# Patient Record
Sex: FEMALE | Race: WHITE
[De-identification: names, ages, dates, MRNs, and addresses within clinical notes are randomized per-mention and may not be internally consistent; named-entity substitution may affect disease eponyms.]

---

## 2019-06-17 ENCOUNTER — HOSPITAL ENCOUNTER (EMERGENCY)
Dept: HOSPITAL 8 - ED | Age: 44
Discharge: HOME | End: 2019-06-17
Payer: COMMERCIAL

## 2019-06-17 VITALS — WEIGHT: 163.14 LBS | BODY MASS INDEX: 24.73 KG/M2 | HEIGHT: 68 IN

## 2019-06-17 VITALS — SYSTOLIC BLOOD PRESSURE: 112 MMHG | DIASTOLIC BLOOD PRESSURE: 68 MMHG

## 2019-06-17 DIAGNOSIS — G43.909: Primary | ICD-10-CM

## 2019-06-17 DIAGNOSIS — R11.2: ICD-10-CM

## 2019-06-17 LAB
ALBUMIN SERPL-MCNC: 4 G/DL (ref 3.4–5)
ALP SERPL-CCNC: 59 U/L (ref 45–117)
ALT SERPL-CCNC: 26 U/L (ref 12–78)
ANION GAP SERPL CALC-SCNC: 8 MMOL/L (ref 5–15)
BASOPHILS # BLD AUTO: 0.03 X10^3/UL (ref 0–0.1)
BASOPHILS NFR BLD AUTO: 0 % (ref 0–1)
BILIRUB SERPL-MCNC: 1 MG/DL (ref 0.2–1)
CALCIUM SERPL-MCNC: 8.6 MG/DL (ref 8.5–10.1)
CHLORIDE SERPL-SCNC: 110 MMOL/L (ref 98–107)
CREAT SERPL-MCNC: 0.77 MG/DL (ref 0.55–1.02)
EOSINOPHIL # BLD AUTO: 0.01 X10^3/UL (ref 0–0.4)
EOSINOPHIL NFR BLD AUTO: 0 % (ref 1–7)
ERYTHROCYTE [DISTWIDTH] IN BLOOD BY AUTOMATED COUNT: 13.6 % (ref 9.6–15.2)
LYMPHOCYTES # BLD AUTO: 0.44 X10^3/UL (ref 1–3.4)
LYMPHOCYTES NFR BLD AUTO: 5 % (ref 22–44)
MCH RBC QN AUTO: 30.4 PG (ref 27–34.8)
MCHC RBC AUTO-ENTMCNC: 33 G/DL (ref 32.4–35.8)
MCV RBC AUTO: 92.1 FL (ref 80–100)
MD: NO
MONOCYTES # BLD AUTO: 0.13 X10^3/UL (ref 0.2–0.8)
MONOCYTES NFR BLD AUTO: 2 % (ref 2–9)
NEUTROPHILS # BLD AUTO: 8.35 X10^3/UL (ref 1.8–6.8)
NEUTROPHILS NFR BLD AUTO: 93 % (ref 42–75)
PLATELET # BLD AUTO: 278 X10^3/UL (ref 130–400)
PMV BLD AUTO: 8.2 FL (ref 7.4–10.4)
PROT SERPL-MCNC: 7.2 G/DL (ref 6.4–8.2)
RBC # BLD AUTO: 4.13 X10^6/UL (ref 3.82–5.3)

## 2019-06-17 PROCEDURE — 36415 COLL VENOUS BLD VENIPUNCTURE: CPT

## 2019-06-17 PROCEDURE — 96374 THER/PROPH/DIAG INJ IV PUSH: CPT

## 2019-06-17 PROCEDURE — 84703 CHORIONIC GONADOTROPIN ASSAY: CPT

## 2019-06-17 PROCEDURE — 70450 CT HEAD/BRAIN W/O DYE: CPT

## 2019-06-17 PROCEDURE — 96361 HYDRATE IV INFUSION ADD-ON: CPT

## 2019-06-17 PROCEDURE — 80053 COMPREHEN METABOLIC PANEL: CPT

## 2019-06-17 PROCEDURE — 85025 COMPLETE CBC W/AUTO DIFF WBC: CPT

## 2019-06-17 PROCEDURE — 96375 TX/PRO/DX INJ NEW DRUG ADDON: CPT

## 2019-06-17 PROCEDURE — 99284 EMERGENCY DEPT VISIT MOD MDM: CPT

## 2019-06-17 NOTE — NUR
BACK FROM CT. PT RESTING COMFORTABLY ON GURNEY, VSS, AAO X4, ALL NEEDS MET AND 
FALL PRECAUTIONS IN PLACE.

## 2019-06-17 NOTE — NUR
BIB [facility] TO ROOM 15 FOR H/A AT 0530. PT TOOK MEDICATION AT HOME PTA. PT ALSO 
REPORTS N/V X 5 TIMES AND DIARRHEA X1. HX BRAIN CANCER WITH 
SURGERY/CHEMO/RADIATION. VSS, PT AAO X 4, PIV ESTABLISHED IN FIELD. PT IN GOWN 
AND RESTING IN [facility].

## 2021-10-21 VITALS
SYSTOLIC BLOOD PRESSURE: 130 MMHG | WEIGHT: 173.94 LBS | HEIGHT: 68 IN | DIASTOLIC BLOOD PRESSURE: 84 MMHG | RESPIRATION RATE: 16 BRPM | TEMPERATURE: 97 F | HEART RATE: 64 BPM | OXYGEN SATURATION: 100 %

## 2021-10-21 NOTE — ASU PATIENT PROFILE, ADULT - NSICDXPASTMEDICALHX_GEN_ALL_CORE_FT
PAST MEDICAL HISTORY:  Anxiety     Central pain syndrome     Migraine      PAST MEDICAL HISTORY:  Anxiety     Brain cancer s/p radiation      Central pain syndrome     Endometriosis     H/O weakness RLE    Migraine     JAYNA treated with BiPAP      PAST MEDICAL HISTORY:  Adenomyosis     Anxiety     Anxiety and depression     Brain cancer s/p radiation      Central pain syndrome     Endometriosis     H/O weakness RLE    Migraine     JAYNA treated with BiPAP     Simple partial seizures

## 2021-10-22 ENCOUNTER — INPATIENT (INPATIENT)
Facility: HOSPITAL | Age: 46
LOS: 0 days | Discharge: HOME | DRG: 743 | End: 2021-10-23
Attending: OBSTETRICS & GYNECOLOGY | Admitting: OBSTETRICS & GYNECOLOGY
Payer: MEDICARE

## 2021-10-22 DIAGNOSIS — R32 UNSPECIFIED URINARY INCONTINENCE: ICD-10-CM

## 2021-10-22 DIAGNOSIS — Z98.890 OTHER SPECIFIED POSTPROCEDURAL STATES: Chronic | ICD-10-CM

## 2021-10-22 DIAGNOSIS — F41.9 ANXIETY DISORDER, UNSPECIFIED: ICD-10-CM

## 2021-10-22 DIAGNOSIS — K37 UNSPECIFIED APPENDICITIS: ICD-10-CM

## 2021-10-22 DIAGNOSIS — F32.A DEPRESSION, UNSPECIFIED: ICD-10-CM

## 2021-10-22 DIAGNOSIS — G47.33 OBSTRUCTIVE SLEEP APNEA (ADULT) (PEDIATRIC): ICD-10-CM

## 2021-10-22 DIAGNOSIS — G89.0 CENTRAL PAIN SYNDROME: ICD-10-CM

## 2021-10-22 DIAGNOSIS — N80.0 ENDOMETRIOSIS OF UTERUS: ICD-10-CM

## 2021-10-22 DIAGNOSIS — K21.9 GASTRO-ESOPHAGEAL REFLUX DISEASE WITHOUT ESOPHAGITIS: ICD-10-CM

## 2021-10-22 DIAGNOSIS — Z99.89 DEPENDENCE ON OTHER ENABLING MACHINES AND DEVICES: ICD-10-CM

## 2021-10-22 LAB
BLD GP AB SCN SERPL QL: NEGATIVE — SIGNIFICANT CHANGE UP
GLUCOSE BLDC GLUCOMTR-MCNC: 68 MG/DL — LOW (ref 70–99)
RH IG SCN BLD-IMP: POSITIVE — SIGNIFICANT CHANGE UP

## 2021-10-22 PROCEDURE — 88304 TISSUE EXAM BY PATHOLOGIST: CPT | Mod: 26

## 2021-10-22 PROCEDURE — 88342 IMHCHEM/IMCYTCHM 1ST ANTB: CPT | Mod: 26

## 2021-10-22 PROCEDURE — 88305 TISSUE EXAM BY PATHOLOGIST: CPT | Mod: 26

## 2021-10-22 PROCEDURE — 88307 TISSUE EXAM BY PATHOLOGIST: CPT | Mod: 26

## 2021-10-22 PROCEDURE — 88341 IMHCHEM/IMCYTCHM EA ADD ANTB: CPT | Mod: 26

## 2021-10-22 RX ORDER — ACETAMINOPHEN 500 MG
1000 TABLET ORAL ONCE
Refills: 0 | Status: COMPLETED | OUTPATIENT
Start: 2021-10-22 | End: 2021-10-22

## 2021-10-22 RX ORDER — LANOLIN ALCOHOL/MO/W.PET/CERES
1 CREAM (GRAM) TOPICAL
Qty: 0 | Refills: 0 | DISCHARGE

## 2021-10-22 RX ORDER — PHENAZOPYRIDINE HCL 100 MG
200 TABLET ORAL ONCE
Refills: 0 | Status: DISCONTINUED | OUTPATIENT
Start: 2021-10-22 | End: 2021-10-22

## 2021-10-22 RX ORDER — HYDROMORPHONE HYDROCHLORIDE 2 MG/ML
0.5 INJECTION INTRAMUSCULAR; INTRAVENOUS; SUBCUTANEOUS ONCE
Refills: 0 | Status: DISCONTINUED | OUTPATIENT
Start: 2021-10-22 | End: 2021-10-22

## 2021-10-22 RX ORDER — AMITRIPTYLINE HCL 25 MG
1 TABLET ORAL
Qty: 0 | Refills: 0 | DISCHARGE

## 2021-10-22 RX ORDER — FLUOXETINE HCL 10 MG
20 CAPSULE ORAL EVERY 24 HOURS
Refills: 0 | Status: DISCONTINUED | OUTPATIENT
Start: 2021-10-22 | End: 2021-10-23

## 2021-10-22 RX ORDER — RIMEGEPANT SULFATE 75 MG/75MG
1 TABLET, ORALLY DISINTEGRATING ORAL
Qty: 0 | Refills: 0 | DISCHARGE

## 2021-10-22 RX ORDER — GALCANEZUMAB 100 MG/ML
0 INJECTION, SOLUTION SUBCUTANEOUS
Qty: 0 | Refills: 0 | DISCHARGE

## 2021-10-22 RX ORDER — FLUOXETINE HCL 10 MG
1 CAPSULE ORAL
Qty: 0 | Refills: 0 | DISCHARGE

## 2021-10-22 RX ORDER — OXYCODONE HYDROCHLORIDE 5 MG/1
5 TABLET ORAL EVERY 4 HOURS
Refills: 0 | Status: DISCONTINUED | OUTPATIENT
Start: 2021-10-22 | End: 2021-10-23

## 2021-10-22 RX ORDER — HEPARIN SODIUM 5000 [USP'U]/ML
5000 INJECTION INTRAVENOUS; SUBCUTANEOUS ONCE
Refills: 0 | Status: COMPLETED | OUTPATIENT
Start: 2021-10-22 | End: 2021-10-22

## 2021-10-22 RX ORDER — ACETAMINOPHEN 500 MG
2 TABLET ORAL
Qty: 0 | Refills: 0 | DISCHARGE

## 2021-10-22 RX ORDER — FLUTICASONE PROPIONATE 50 MCG
1 SPRAY, SUSPENSION NASAL
Qty: 0 | Refills: 0 | DISCHARGE

## 2021-10-22 RX ORDER — PROPRANOLOL HCL 160 MG
1 CAPSULE, EXTENDED RELEASE 24HR ORAL
Qty: 0 | Refills: 0 | DISCHARGE

## 2021-10-22 RX ORDER — OXYCODONE HYDROCHLORIDE 5 MG/1
15 TABLET ORAL EVERY 8 HOURS
Refills: 0 | Status: DISCONTINUED | OUTPATIENT
Start: 2021-10-22 | End: 2021-10-23

## 2021-10-22 RX ORDER — FEXOFENADINE HCL 30 MG
0 TABLET ORAL
Qty: 0 | Refills: 0 | DISCHARGE

## 2021-10-22 RX ORDER — CELECOXIB 200 MG/1
400 CAPSULE ORAL ONCE
Refills: 0 | Status: COMPLETED | OUTPATIENT
Start: 2021-10-22 | End: 2021-10-22

## 2021-10-22 RX ORDER — SODIUM CHLORIDE 9 MG/ML
1000 INJECTION, SOLUTION INTRAVENOUS
Refills: 0 | Status: DISCONTINUED | OUTPATIENT
Start: 2021-10-22 | End: 2021-10-23

## 2021-10-22 RX ORDER — NARATRIPTAN HYDROCHLORIDE 1 MG/1
1 TABLET, FILM COATED ORAL
Qty: 0 | Refills: 0 | DISCHARGE

## 2021-10-22 RX ORDER — ACETAMINOPHEN 500 MG
1000 TABLET ORAL EVERY 6 HOURS
Refills: 0 | Status: DISCONTINUED | OUTPATIENT
Start: 2021-10-22 | End: 2021-10-23

## 2021-10-22 RX ORDER — LAMOTRIGINE 25 MG/1
0 TABLET, ORALLY DISINTEGRATING ORAL
Qty: 0 | Refills: 0 | DISCHARGE

## 2021-10-22 RX ORDER — LANOLIN ALCOHOL/MO/W.PET/CERES
5 CREAM (GRAM) TOPICAL AT BEDTIME
Refills: 0 | Status: DISCONTINUED | OUTPATIENT
Start: 2021-10-22 | End: 2021-10-23

## 2021-10-22 RX ORDER — KETOROLAC TROMETHAMINE 30 MG/ML
30 SYRINGE (ML) INJECTION EVERY 8 HOURS
Refills: 0 | Status: DISCONTINUED | OUTPATIENT
Start: 2021-10-22 | End: 2021-10-23

## 2021-10-22 RX ORDER — LAMOTRIGINE 25 MG/1
250 TABLET, ORALLY DISINTEGRATING ORAL EVERY 24 HOURS
Refills: 0 | Status: DISCONTINUED | OUTPATIENT
Start: 2021-10-22 | End: 2021-10-23

## 2021-10-22 RX ORDER — DIAZEPAM 5 MG
5 TABLET ORAL EVERY 6 HOURS
Refills: 0 | Status: DISCONTINUED | OUTPATIENT
Start: 2021-10-22 | End: 2021-10-23

## 2021-10-22 RX ORDER — HYDROMORPHONE HYDROCHLORIDE 2 MG/ML
1 INJECTION INTRAMUSCULAR; INTRAVENOUS; SUBCUTANEOUS
Refills: 0 | Status: DISCONTINUED | OUTPATIENT
Start: 2021-10-22 | End: 2021-10-23

## 2021-10-22 RX ORDER — IBUPROFEN 200 MG
1 TABLET ORAL
Qty: 0 | Refills: 0 | DISCHARGE

## 2021-10-22 RX ORDER — AMITRIPTYLINE HCL 25 MG
50 TABLET ORAL AT BEDTIME
Refills: 0 | Status: DISCONTINUED | OUTPATIENT
Start: 2021-10-22 | End: 2021-10-23

## 2021-10-22 RX ORDER — SIMETHICONE 80 MG/1
80 TABLET, CHEWABLE ORAL EVERY 6 HOURS
Refills: 0 | Status: DISCONTINUED | OUTPATIENT
Start: 2021-10-22 | End: 2021-10-23

## 2021-10-22 RX ORDER — GABAPENTIN 400 MG/1
600 CAPSULE ORAL ONCE
Refills: 0 | Status: COMPLETED | OUTPATIENT
Start: 2021-10-22 | End: 2021-10-22

## 2021-10-22 RX ADMIN — HYDROMORPHONE HYDROCHLORIDE 1 MILLIGRAM(S): 2 INJECTION INTRAMUSCULAR; INTRAVENOUS; SUBCUTANEOUS at 18:31

## 2021-10-22 RX ADMIN — GABAPENTIN 600 MILLIGRAM(S): 400 CAPSULE ORAL at 11:21

## 2021-10-22 RX ADMIN — HYDROMORPHONE HYDROCHLORIDE 0.5 MILLIGRAM(S): 2 INJECTION INTRAMUSCULAR; INTRAVENOUS; SUBCUTANEOUS at 19:30

## 2021-10-22 RX ADMIN — HEPARIN SODIUM 5000 UNIT(S): 5000 INJECTION INTRAVENOUS; SUBCUTANEOUS at 11:22

## 2021-10-22 RX ADMIN — Medication 1000 MILLIGRAM(S): at 20:13

## 2021-10-22 RX ADMIN — Medication 5 MILLIGRAM(S): at 19:47

## 2021-10-22 RX ADMIN — Medication 30 MILLIGRAM(S): at 21:11

## 2021-10-22 RX ADMIN — Medication 1000 MILLIGRAM(S): at 23:34

## 2021-10-22 RX ADMIN — HYDROMORPHONE HYDROCHLORIDE 1 MILLIGRAM(S): 2 INJECTION INTRAMUSCULAR; INTRAVENOUS; SUBCUTANEOUS at 22:50

## 2021-10-22 RX ADMIN — CELECOXIB 400 MILLIGRAM(S): 200 CAPSULE ORAL at 11:22

## 2021-10-22 RX ADMIN — OXYCODONE HYDROCHLORIDE 5 MILLIGRAM(S): 5 TABLET ORAL at 19:13

## 2021-10-22 RX ADMIN — Medication 30 MILLIGRAM(S): at 21:52

## 2021-10-22 RX ADMIN — Medication 1000 MILLIGRAM(S): at 18:57

## 2021-10-22 RX ADMIN — OXYCODONE HYDROCHLORIDE 15 MILLIGRAM(S): 5 TABLET ORAL at 21:52

## 2021-10-22 RX ADMIN — HYDROMORPHONE HYDROCHLORIDE 1 MILLIGRAM(S): 2 INJECTION INTRAMUSCULAR; INTRAVENOUS; SUBCUTANEOUS at 21:57

## 2021-10-22 RX ADMIN — Medication 50 MILLIGRAM(S): at 21:11

## 2021-10-22 RX ADMIN — HYDROMORPHONE HYDROCHLORIDE 0.5 MILLIGRAM(S): 2 INJECTION INTRAMUSCULAR; INTRAVENOUS; SUBCUTANEOUS at 18:49

## 2021-10-22 RX ADMIN — OXYCODONE HYDROCHLORIDE 5 MILLIGRAM(S): 5 TABLET ORAL at 23:34

## 2021-10-22 RX ADMIN — HYDROMORPHONE HYDROCHLORIDE 1 MILLIGRAM(S): 2 INJECTION INTRAMUSCULAR; INTRAVENOUS; SUBCUTANEOUS at 18:50

## 2021-10-22 RX ADMIN — Medication 1000 MILLIGRAM(S): at 11:21

## 2021-10-22 RX ADMIN — SIMETHICONE 80 MILLIGRAM(S): 80 TABLET, CHEWABLE ORAL at 23:34

## 2021-10-22 RX ADMIN — OXYCODONE HYDROCHLORIDE 15 MILLIGRAM(S): 5 TABLET ORAL at 21:10

## 2021-10-22 RX ADMIN — OXYCODONE HYDROCHLORIDE 5 MILLIGRAM(S): 5 TABLET ORAL at 19:48

## 2021-10-22 NOTE — H&P ADULT - NSICDXPASTMEDICALHX_GEN_ALL_CORE_FT
PAST MEDICAL HISTORY:  Adenomyosis     Anxiety     Anxiety and depression     Brain cancer s/p radiation      Central pain syndrome     Endometriosis     H/O weakness RLE    Migraine     JAYNA treated with BiPAP     Simple partial seizures

## 2021-10-22 NOTE — PROVIDER CONTACT NOTE (OTHER) - ASSESSMENT
Pt was ordered Lactated Ringers at 5ml/hr. Wanted to confirm that was the correct rate. Pt also has a rodriguez catheter but no order for catheter.

## 2021-10-22 NOTE — BRIEF OPERATIVE NOTE - DISPOSITION
[facility]  Brief Operative Note     SUMMARY     Surgery Date: 12/27/2018     Surgeon(s) and Role:     * Arjun Jorge MD - Primary    Assisting Surgeon: None    Pre-op Diagnosis:  Nasal turbinate hypertrophy [J34.3]  Tonsillar hypertrophy [J35.1]  Sleep-disordered breathing [G47.30]  Non-seasonal allergic rhinitis due to pollen [J30.1]    Post-op Diagnosis:  Post-Op Diagnosis Codes:     * Nasal turbinate hypertrophy [J34.3]     * Tonsillar hypertrophy [J35.1]     * Sleep-disordered breathing [G47.30]     * Non-seasonal allergic rhinitis due to pollen [J30.1]    Procedure(s) (LRB):  TONSILLECTOMY AND ADENOIDECTOMY (N/A)  REDUCTION, NASAL TURBINATE (Bilateral)    Anesthesia: General    Description of the findings of the procedure: see op note    Findings/Key Components: see op note    Estimated Blood Loss: 5 cc         Specimens:   Specimen (12h ago, onward)    None          Discharge Note    SUMMARY     Admit Date: 12/27/2018    Discharge Date and Time:  12/27/2018 8:38 AM    Hospital Course (synopsis of major diagnoses, care, treatment, and services provided during the course of the hospital stay): 5 y.o. female admitted for above procedures, tolerated well. Patient was transferred to PACU for recovery. After an appropriate period under direct observation, patient will be discharged home with pain medication, dexamethasone tab.        Final Diagnosis: Post-Op Diagnosis Codes:     * Nasal turbinate hypertrophy [J34.3]     * Tonsillar hypertrophy [J35.1]     * Sleep-disordered breathing [G47.30]     * Non-seasonal allergic rhinitis due to pollen [J30.1]    Disposition: Home or Self Care    Follow Up/Patient Instructions:     Medications:  Reconciled Home Medications:      Medication List      START taking these medications    dexamethasone 1 mg/mL Drop oral drops  Commonly known as:  DEXAMETHASONE INTENSOL  Take 6 mLs (6 mg total) by mouth every other day. for 5 doses     hydrocodone-apap  7.5-325 MG/15 ML oral solution  Commonly known as:  HYCET  Take 5.5 mLs by mouth every 8 (eight) hours as needed for Pain.     ibuprofen 100 mg/5 mL suspension  Commonly known as:  ADVIL,MOTRIN  Take 14 mLs (280 mg total) by mouth every 6 (six) hours as needed for Pain.        CONTINUE taking these medications    azelastine 137 mcg (0.1 %) nasal spray  Commonly known as:  ASTELIN  1 spray (137 mcg total) by Nasal route 2 (two) times daily.     cetirizine 1 mg/mL syrup  Commonly known as:  ZYRTEC  Take 5 mLs (5 mg total) by mouth once daily.     montelukast 4 MG chewable tablet  Commonly known as:  SINGULAIR  Take 1 tablet (4 mg total) by mouth every evening.     triamcinolone acetonide 0.025% 0.025 % Oint  Commonly known as:  KENALOG  Apply topically 2 (two) times daily.        STOP taking these medications    estradiol 0.01 % (0.1 mg/gram) vaginal cream  Commonly known as:  ESTRACE          Discharge Procedure Orders   Advance diet as tolerated     Activity order - Light Activity    Order Comments: For 2 weeks     Follow-up Information     Lara Duncan NP In 3 weeks.    Specialty:  Pediatric Otolaryngology  Contact information:  [address] SAUC[address]  [facility] [address] [phone]                  pacu

## 2021-10-22 NOTE — H&P ADULT - HISTORY OF PRESENT ILLNESS
46yo P2 admitted for post operative monitoring in the setting of scheduled TLH, BS and endometriosis excision     OBHx: ,  x 2  GYN Hx: suspected endometriosis, LMP 2 years ago after endometrial ablation   PMHx: JAYNA, brain cancer dx  s/p craniotomy with near complete resection after chemo/RT, simple partial seizures, anxiety/depression, migraines  SHx: Craniotomy and brain resection , endometrial ablation   Meds: Lamotrigine 250mg ER BID, [place] 13.5mg ER BID, Fluoxitine 20mg qD, Amitriptyline 50mg qD, Propranolol ER 60mg QHS, Emgalty monthly, Nurtec ODT, oxycodone 5mg prn, naratriptanm 2.5mg prn migraine, diazepam 5mg PRN  Allergies: modanifil- CP, lyrica- SI      PHYSICAL EXAM:   Vital Signs Last 24 Hrs  T(C): --  T(F): --  HR: --  BP: --  BP(mean): --  RR: --  SpO2: --    **************************  Constitutional: Alert & Oriented x3, No acute distress  Respiratory: Clear to ausculation bilaterally; no wheezing, rhonchi, or crackles  Cardiovascular: regular rate and rhythm, no murmurs, or gallops  Gastrointestinal: soft, non tender, positive bowel sounds, no rebound or guarding   Pelvic exam:   Extremities: no calf tenderness or swelling      LABS:                  RADIOLOGY & ADDITIONAL STUDIES:

## 2021-10-22 NOTE — BRIEF OPERATIVE NOTE - OPERATION/FINDINGS
Mobile 12cm uterus, areas of endometriosis in lower pelvis/ cul de sac. Vaginal cuff closed with interrupted vycryl suture. Erickson colposuspension performed with Gortec sutures with excellent hemostasis. Liver noted to be fibrotic. Cysto demonstrated bilateral UO

## 2021-10-22 NOTE — BRIEF OPERATIVE NOTE - NSICDXBRIEFPROCEDURE_GEN_ALL_CORE_FT
PROCEDURES:  Laparoscopic total hysterectomy with cystoscopy 22-Oct-2021 18:39:53  Eliza Demarco  Bilateral salpingoophorectomy 22-Oct-2021 18:40:07  Eliza Demarco  Cystoscopy with Erickson colposuspension 22-Oct-2021 18:40:26  Eliza Demarco  Excision, endometriosis, laparoscopic 22-Oct-2021 18:40:36  Eliza Demarco  Lap appendectomy 22-Oct-2021 18:41:52  Eliza Demarco

## 2021-10-22 NOTE — H&P ADULT - ASSESSMENT
Patient admitted for post  operative monitoring in the setting of scheduled TLH, BS, endometriosis excision. H/o craniotomy and chronic pain, will assist in pain control

## 2021-10-22 NOTE — PACU DISCHARGE NOTE - COMMENTS
Pt A&Ox4, operative site clean, dry and intact with dermabond, pain controlled with current regimen. Plan of care endorsed to receiving yousuf Nicole

## 2021-10-23 VITALS
DIASTOLIC BLOOD PRESSURE: 55 MMHG | SYSTOLIC BLOOD PRESSURE: 94 MMHG | RESPIRATION RATE: 16 BRPM | HEART RATE: 82 BPM | OXYGEN SATURATION: 100 % | TEMPERATURE: 98 F

## 2021-10-23 LAB
COVID-19 SPIKE DOMAIN AB INTERP: POSITIVE
COVID-19 SPIKE DOMAIN ANTIBODY RESULT: >250 U/ML — HIGH
HCT VFR BLD CALC: 30.8 % — LOW (ref 34.5–45)
HGB BLD-MCNC: 9.9 G/DL — LOW (ref 11.5–15.5)
MCHC RBC-ENTMCNC: 30.5 PG — SIGNIFICANT CHANGE UP (ref 27–34)
MCHC RBC-ENTMCNC: 32.1 GM/DL — SIGNIFICANT CHANGE UP (ref 32–36)
MCV RBC AUTO: 94.8 FL — SIGNIFICANT CHANGE UP (ref 80–100)
NRBC # BLD: 0 /100 WBCS — SIGNIFICANT CHANGE UP (ref 0–0)
PLATELET # BLD AUTO: 312 K/UL — SIGNIFICANT CHANGE UP (ref 150–400)
RBC # BLD: 3.25 M/UL — LOW (ref 3.8–5.2)
RBC # FLD: 12.5 % — SIGNIFICANT CHANGE UP (ref 10.3–14.5)
SARS-COV-2 IGG+IGM SERPL QL IA: >250 U/ML — HIGH
SARS-COV-2 IGG+IGM SERPL QL IA: POSITIVE
WBC # BLD: 15.1 K/UL — HIGH (ref 3.8–10.5)
WBC # FLD AUTO: 15.1 K/UL — HIGH (ref 3.8–10.5)

## 2021-10-23 RX ORDER — KETOROLAC TROMETHAMINE 30 MG/ML
30 SYRINGE (ML) INJECTION EVERY 6 HOURS
Refills: 0 | Status: DISCONTINUED | OUTPATIENT
Start: 2021-10-23 | End: 2021-10-23

## 2021-10-23 RX ORDER — OXYBUTYNIN CHLORIDE 5 MG
5 TABLET ORAL EVERY 8 HOURS
Refills: 0 | Status: DISCONTINUED | OUTPATIENT
Start: 2021-10-23 | End: 2021-10-23

## 2021-10-23 RX ORDER — OXYCODONE HYDROCHLORIDE 5 MG/1
10 TABLET ORAL EVERY 4 HOURS
Refills: 0 | Status: DISCONTINUED | OUTPATIENT
Start: 2021-10-23 | End: 2021-10-23

## 2021-10-23 RX ORDER — OXYCODONE HYDROCHLORIDE 5 MG/1
1 TABLET ORAL
Qty: 0 | Refills: 0 | DISCHARGE

## 2021-10-23 RX ORDER — PHENAZOPYRIDINE HCL 100 MG
100 TABLET ORAL EVERY 8 HOURS
Refills: 0 | Status: DISCONTINUED | OUTPATIENT
Start: 2021-10-23 | End: 2021-10-23

## 2021-10-23 RX ORDER — OXYBUTYNIN CHLORIDE 5 MG
1 TABLET ORAL
Qty: 9 | Refills: 0
Start: 2021-10-23 | End: 2021-10-25

## 2021-10-23 RX ORDER — NORETHINDRONE 0.35 MG/1
1 TABLET ORAL
Qty: 0 | Refills: 0 | DISCHARGE

## 2021-10-23 RX ORDER — ONDANSETRON 8 MG/1
1 TABLET, FILM COATED ORAL
Qty: 0 | Refills: 0 | DISCHARGE

## 2021-10-23 RX ORDER — SIMETHICONE 80 MG/1
1 TABLET, CHEWABLE ORAL
Qty: 56 | Refills: 0
Start: 2021-10-23 | End: 2021-11-05

## 2021-10-23 RX ORDER — PROCHLORPERAZINE MALEATE 5 MG
1 TABLET ORAL
Qty: 0 | Refills: 0 | DISCHARGE

## 2021-10-23 RX ORDER — PHENAZOPYRIDINE HCL 100 MG
1 TABLET ORAL
Qty: 9 | Refills: 0
Start: 2021-10-23 | End: 2021-10-25

## 2021-10-23 RX ADMIN — OXYCODONE HYDROCHLORIDE 5 MILLIGRAM(S): 5 TABLET ORAL at 05:07

## 2021-10-23 RX ADMIN — SIMETHICONE 80 MILLIGRAM(S): 80 TABLET, CHEWABLE ORAL at 12:47

## 2021-10-23 RX ADMIN — OXYCODONE HYDROCHLORIDE 15 MILLIGRAM(S): 5 TABLET ORAL at 13:59

## 2021-10-23 RX ADMIN — Medication 1000 MILLIGRAM(S): at 00:34

## 2021-10-23 RX ADMIN — SIMETHICONE 80 MILLIGRAM(S): 80 TABLET, CHEWABLE ORAL at 05:08

## 2021-10-23 RX ADMIN — OXYCODONE HYDROCHLORIDE 15 MILLIGRAM(S): 5 TABLET ORAL at 00:00

## 2021-10-23 RX ADMIN — OXYCODONE HYDROCHLORIDE 15 MILLIGRAM(S): 5 TABLET ORAL at 05:45

## 2021-10-23 RX ADMIN — LAMOTRIGINE 250 MILLIGRAM(S): 25 TABLET, ORALLY DISINTEGRATING ORAL at 05:08

## 2021-10-23 RX ADMIN — OXYCODONE HYDROCHLORIDE 5 MILLIGRAM(S): 5 TABLET ORAL at 10:54

## 2021-10-23 RX ADMIN — SODIUM CHLORIDE 125 MILLILITER(S): 9 INJECTION, SOLUTION INTRAVENOUS at 07:31

## 2021-10-23 RX ADMIN — HYDROMORPHONE HYDROCHLORIDE 1 MILLIGRAM(S): 2 INJECTION INTRAMUSCULAR; INTRAVENOUS; SUBCUTANEOUS at 06:30

## 2021-10-23 RX ADMIN — Medication 30 MILLIGRAM(S): at 14:59

## 2021-10-23 RX ADMIN — Medication 20 MILLIGRAM(S): at 05:07

## 2021-10-23 RX ADMIN — SODIUM CHLORIDE 125 MILLILITER(S): 9 INJECTION, SOLUTION INTRAVENOUS at 00:34

## 2021-10-23 RX ADMIN — OXYCODONE HYDROCHLORIDE 10 MILLIGRAM(S): 5 TABLET ORAL at 17:20

## 2021-10-23 RX ADMIN — OXYCODONE HYDROCHLORIDE 10 MILLIGRAM(S): 5 TABLET ORAL at 16:20

## 2021-10-23 RX ADMIN — OXYCODONE HYDROCHLORIDE 5 MILLIGRAM(S): 5 TABLET ORAL at 09:54

## 2021-10-23 RX ADMIN — Medication 5 MILLIGRAM(S): at 00:34

## 2021-10-23 RX ADMIN — OXYCODONE HYDROCHLORIDE 5 MILLIGRAM(S): 5 TABLET ORAL at 04:30

## 2021-10-23 RX ADMIN — Medication 1000 MILLIGRAM(S): at 12:46

## 2021-10-23 RX ADMIN — Medication 5 MILLIGRAM(S): at 12:46

## 2021-10-23 RX ADMIN — Medication 30 MILLIGRAM(S): at 05:45

## 2021-10-23 RX ADMIN — Medication 30 MILLIGRAM(S): at 13:59

## 2021-10-23 RX ADMIN — Medication 100 MILLIGRAM(S): at 13:58

## 2021-10-23 RX ADMIN — OXYCODONE HYDROCHLORIDE 15 MILLIGRAM(S): 5 TABLET ORAL at 05:07

## 2021-10-23 RX ADMIN — OXYCODONE HYDROCHLORIDE 5 MILLIGRAM(S): 5 TABLET ORAL at 00:34

## 2021-10-23 RX ADMIN — Medication 30 MILLIGRAM(S): at 05:08

## 2021-10-23 RX ADMIN — Medication 1000 MILLIGRAM(S): at 13:46

## 2021-10-23 RX ADMIN — Medication 1000 MILLIGRAM(S): at 05:08

## 2021-10-23 RX ADMIN — Medication 1000 MILLIGRAM(S): at 05:45

## 2021-10-23 RX ADMIN — Medication 5 MILLIGRAM(S): at 07:31

## 2021-10-23 RX ADMIN — HYDROMORPHONE HYDROCHLORIDE 1 MILLIGRAM(S): 2 INJECTION INTRAMUSCULAR; INTRAVENOUS; SUBCUTANEOUS at 05:46

## 2021-10-23 RX ADMIN — Medication 5 MILLIGRAM(S): at 12:47

## 2021-10-23 NOTE — DISCHARGE NOTE PROVIDER - HOSPITAL COURSE
Pt is a 45yoF s/p TLH, BS, endo excision, puckett procedure, appendectomy admitted to TELE for post-op pain monitoring. Pt meeting most post-op milestones, ambulating, eating/drinkin, and pain is well controlled. Pt failed active TOV and will go home with a rodriguez in place to be removed in 3-4days for a rpt trial. Pt hemodynamically stable and ready for discharge home with strict return precautions.

## 2021-10-23 NOTE — DISCHARGE NOTE PROVIDER - NSDCMRMEDTOKEN_GEN_ALL_CORE_FT
acetaminophen 500 mg oral tablet: 2 tab(s) orally every 6 hours, As Needed  Allegra 30 mg oral tablet:   amitriptyline 50 mg oral tablet: 1 tab(s) orally once a day (at bedtime)  Emgality Prefilled Syringe 100 mg/mL subcutaneous solution: subcutaneous once a month  Flonase 50 mcg/inh nasal spray: 1 spray(s) nasal once a day, As Needed  FLUoxetine 20 mg oral capsule: 1 cap(s) orally once a day  ibuprofen 200 mg oral tablet: 1 tab(s) orally every 6 hours, As Needed  LaMICtal  mg oral tablet, extended release: orally 2 times a day  Melatonin 3 mg oral tablet: 1 tab(s) orally once a day (at bedtime), As Needed  naratriptan 2.5 mg oral tablet: 1 tab(s) orally once a day  Nurtec ODT 75 mg oral tablet, disintegratin tab(s) orally once  oxybutynin 5 mg oral tablet: 1 tab(s) orally every 8 hours  phenazopyridine 100 mg oral tablet: 1 tab(s) orally every 8 hours  propranolol 60 mg oral capsule, extended release: 1 cap(s) orally once a day (at bedtime)  simethicone 80 mg oral tablet, chewable: 1 tab(s) orally every 6 hours

## 2021-10-23 NOTE — DISCHARGE NOTE PROVIDER - NSDCFUADDINST_GEN_ALL_CORE_FT
- Nothing in vagina - no intercourse, tampons, or douching until cleared by your doctor.   - Avoid swimming, tub baths, strenuous activity and heavy lifting until cleared by your doctor.   - Showering is ok.   - Continue oral pain medications as needed for pain.  Please follow the pain medication instructions and prescriptions ordered by Dr. Carney's team.  - Follow up in office in 2 weeks for your postoperative visit.  Call the office to confirm your follow up appointment.   - Call the office sooner if you develop any fever, heavy bleeding, or severe pain.  Go to the closest emergency room for any of these symptoms if you are not able to contact your doctor.

## 2021-10-23 NOTE — DISCHARGE NOTE NURSING/CASE MANAGEMENT/SOCIAL WORK - PATIENT PORTAL LINK FT
You can access the FollowMyHealth Patient Portal offered by [facility] by registering at the following website: [email or web address]. By joining [facility]’s FollowMyHealth portal, you will also be able to view your health information using other applications (apps) compatible with our system.

## 2021-10-23 NOTE — DISCHARGE NOTE PROVIDER - NSDCCPTREATMENT_GEN_ALL_CORE_FT
PRINCIPAL PROCEDURE  Procedure: Laparoscopic total hysterectomy with cystoscopy  Findings and Treatment:       SECONDARY PROCEDURE  Procedure: Bilateral salpingoophorectomy  Findings and Treatment:     Procedure: Cystoscopy with Erickson colposuspension  Findings and Treatment:     Procedure: Excision, endometriosis, laparoscopic  Findings and Treatment:     Procedure: Laparoscopic total hysterectomy with cystoscopy  Findings and Treatment:

## 2021-10-23 NOTE — PROGRESS NOTE ADULT - SUBJECTIVE AND OBJECTIVE BOX
Patient evaluated at bedside. No acute events overnight. Patient denies chest pain, SOB, nausea, vomiting. Still having some pain overnight, but improved on medications. Not yet passing gas. Brito is in place and she has not yet ambulated. Some nausea overnight, improved with medications. No vomiting.      T(C): 36.4 (10-23-21 @ 04:27), Max: 36.8 (10-22-21 @ 20:17)  HR: 83 (10-23-21 @ 05:42) (69 - 92)  BP: 95/53 (10-23-21 @ 05:42) (94/57 - 141/98)  RR: 18 (10-23-21 @ 05:42) (11 - 20)  SpO2: 100% (10-23-21 @ 05:42) (95% - 100%)  Wt(kg): --    Gen: Well-appearing. NAD.  Resp: Breathing comfortably on RA.  Abd: Soft, appropriately TTP post-op, non-distended, +BS  : Brito to gravity draining clear urine.  Ext: No calf tenderness        10-22 @ 07:01  -  10-23 @ 06:38  --------------------------------------------------------  IN: 1125 mL / OUT: 1975 mL / NET: -850 mL            acetaminophen     Tablet .. 1000 milliGRAM(s) Oral every 6 hours  acetaminophen     Tablet .. 1000 milliGRAM(s) Oral every 6 hours PRN  amitriptyline 50 milliGRAM(s) Oral at bedtime  diazepam    Tablet 5 milliGRAM(s) Oral every 6 hours  FLUoxetine 20 milliGRAM(s) Oral every 24 hours  HYDROmorphone  Injectable 1 milliGRAM(s) IV Push every 2 hours PRN  ketorolac   Injectable 30 milliGRAM(s) IV Push every 8 hours  lactated ringers. 1000 milliLiter(s) IV Continuous <Continuous>  lamoTRIgine 250 milliGRAM(s) Oral every 24 hours  melatonin 5 milliGRAM(s) Oral at bedtime  oxyCODONE    IR 5 milliGRAM(s) Oral every 4 hours PRN  oxyCODONE  ER Tablet 15 milliGRAM(s) Oral every 8 hours  simethicone 80 milliGRAM(s) Chew every 6 hours

## 2021-10-23 NOTE — DISCHARGE NOTE PROVIDER - CARE PROVIDER_API CALL
Abraham Carney)  Obstetrics and Gynecology  [address] [place] [address]  Phone: [phone]  Fax: [phone]  Follow Up Time:

## 2021-10-23 NOTE — PROVIDER CONTACT NOTE (OTHER) - ASSESSMENT
Pt's BP 85/54 HR 81 after receiving dilaudid 1mg IV> Pt asymptomatic. Urine output has been a large amount. Pain level 4/10. Valium 5mg po ordered for 6AM.

## 2021-10-23 NOTE — PROVIDER CONTACT NOTE (OTHER) - ACTION/TREATMENT ORDERED:
MD Vahe initially stated ok to give Valium 5mg. Rechecked BP with dimemap. BP 97/56 HR 81. Valium 5mg po given.
Lactated Ringers rate changed to 125ml/hr. Indwelling rodriguez catheter ordered.

## 2021-11-05 LAB — SURGICAL PATHOLOGY STUDY: SIGNIFICANT CHANGE UP

## 2022-05-10 PROCEDURE — 82962 GLUCOSE BLOOD TEST: CPT

## 2022-05-10 PROCEDURE — 86900 BLOOD TYPING SEROLOGIC ABO: CPT

## 2022-05-10 PROCEDURE — 88341 IMHCHEM/IMCYTCHM EA ADD ANTB: CPT

## 2022-05-10 PROCEDURE — 58999 UNLISTED PX FML GENITAL SYS: CPT

## 2022-05-10 PROCEDURE — 86901 BLOOD TYPING SEROLOGIC RH(D): CPT

## 2022-05-10 PROCEDURE — 64722 DECOMPRESSION UNSPEC NERVE: CPT

## 2022-05-10 PROCEDURE — 52000 CYSTOURETHROSCOPY: CPT

## 2022-05-10 PROCEDURE — 58571 TLH W/T/O 250 G OR LESS: CPT

## 2022-05-10 PROCEDURE — 88307 TISSUE EXAM BY PATHOLOGIST: CPT

## 2022-05-10 PROCEDURE — 36415 COLL VENOUS BLD VENIPUNCTURE: CPT

## 2022-05-10 PROCEDURE — 86769 SARS-COV-2 COVID-19 ANTIBODY: CPT

## 2022-05-10 PROCEDURE — 58662 LAPAROSCOPY EXCISE LESIONS: CPT

## 2022-05-10 PROCEDURE — 88305 TISSUE EXAM BY PATHOLOGIST: CPT

## 2022-05-10 PROCEDURE — 44970 LAPAROSCOPY APPENDECTOMY: CPT

## 2022-05-10 PROCEDURE — 94660 CPAP INITIATION&MGMT: CPT

## 2022-05-10 PROCEDURE — 85027 COMPLETE CBC AUTOMATED: CPT

## 2022-05-10 PROCEDURE — 50722 RELEASE OF URETER: CPT

## 2022-05-10 PROCEDURE — 86850 RBC ANTIBODY SCREEN: CPT

## 2022-05-10 PROCEDURE — 51990 LAPARO URETHRAL SUSPENSION: CPT

## 2022-05-10 PROCEDURE — 88304 TISSUE EXAM BY PATHOLOGIST: CPT
